# Patient Record
Sex: FEMALE | Race: WHITE | NOT HISPANIC OR LATINO
[De-identification: names, ages, dates, MRNs, and addresses within clinical notes are randomized per-mention and may not be internally consistent; named-entity substitution may affect disease eponyms.]

---

## 2023-02-06 PROBLEM — Z00.00 ENCOUNTER FOR PREVENTIVE HEALTH EXAMINATION: Status: ACTIVE | Noted: 2023-02-06

## 2023-04-13 ENCOUNTER — NON-APPOINTMENT (OUTPATIENT)
Age: 83
End: 2023-04-13

## 2023-04-19 ENCOUNTER — LABORATORY RESULT (OUTPATIENT)
Age: 83
End: 2023-04-19

## 2023-04-19 ENCOUNTER — APPOINTMENT (OUTPATIENT)
Dept: PULMONOLOGY | Facility: CLINIC | Age: 83
End: 2023-04-19
Payer: SUBSIDIZED

## 2023-04-19 VITALS
WEIGHT: 70 LBS | DIASTOLIC BLOOD PRESSURE: 70 MMHG | OXYGEN SATURATION: 96 % | BODY MASS INDEX: 15.1 KG/M2 | HEART RATE: 99 BPM | HEIGHT: 57 IN | SYSTOLIC BLOOD PRESSURE: 147 MMHG | TEMPERATURE: 97.7 F

## 2023-04-19 DIAGNOSIS — K86.1 OTHER CHRONIC PANCREATITIS: ICD-10-CM

## 2023-04-19 DIAGNOSIS — I38 ENDOCARDITIS, VALVE UNSPECIFIED: ICD-10-CM

## 2023-04-19 PROCEDURE — 36415 COLL VENOUS BLD VENIPUNCTURE: CPT

## 2023-04-19 PROCEDURE — 99072 ADDL SUPL MATRL&STAF TM PHE: CPT

## 2023-04-19 PROCEDURE — 99204 OFFICE O/P NEW MOD 45 MIN: CPT | Mod: 25

## 2023-04-19 NOTE — ASSESSMENT
[FreeTextEntry1] : Mary [id number]\par \par 83 year old female diagnosed with ILD at outside pulmonologist on ofev for the last 6 months to year with no issues. However, has slowly progressed and is now on 3L constantly with less ability to move around. Leading to evaluation for other options\par \par \par ILD \par Chronic hypoxia\par \par \par Trial enrollment was discussed as well as risk of increased SI which she denies currently having. Patient expressed that she is agreeable to proceed but has not had CT scan of the chest or PFT in the last year. So will order repeat for this now as well as MCTD and vasculitis work up now. Will return for evaluation after this is done and screen for enrollment\par \par - CT chest for ILD evaluation\par - PFT ordered\par - Vasculitis and MCTD work up ordered\par - Return after for evaluation of enrollment

## 2023-04-19 NOTE — HISTORY OF PRESENT ILLNESS
[Never] : never [TextBox_4] : she was diagnosed with pulmonary fibrosis in 2018.  The previous CT and CXR were abnormal but not much.  She started to get more sob and was followed with CT scan. She was started on oxygen about 2 years ago.  She was started on Esbriet but she did not tolerate and now she on Ofev.  She started losing her appetite and nausea.  Now she is on 100 mg twice a day and improved.  She can  ot walk much.  she is on 3l/min oxygen all the time.  The sob is getting worse this month.  She used to walk around the house and now she gets SOB walking from one room to another.  he had lung infection 4 month agoa and was started on antibiotic but did not require ER visit.  She was a nurse in [place].  No pets/birds.  She had second hand smoking exposure.  No arthritis.  Patient has had no hx of exacerbations of her ILD and was diagnosed about 2 years ago with slow progression over the last 6 months to only being able to walk a few rooms at a time from around the house. Also now on 3L oxygen by [facility] [ESS] : 0

## 2023-04-19 NOTE — PHYSICAL EXAM
[No Acute Distress] : no acute distress [Normal Oropharynx] : normal oropharynx [II] : Mallampati Class: II [Normal Appearance] : normal appearance [Normal Rate/Rhythm] : normal rate/rhythm [Normal S1, S2] : normal s1, s2 [No Murmurs] : no murmurs [No Resp Distress] : no resp distress [Clear to Auscultation Bilaterally] : clear to auscultation bilaterally [Benign] : benign [Normal Gait] : normal gait [No Cyanosis] : no cyanosis [No Edema] : no edema [FROM] : FROM [Normal Color/ Pigmentation] : normal color/ pigmentation [Oriented x3] : oriented x3 [Normal Affect] : normal affect

## 2023-04-19 NOTE — REVIEW OF SYSTEMS
[SOB on Exertion] : sob on exertion [Fever] : no fever [Chills] : no chills [Dry Eyes] : no dry eyes [Eye Irritation] : no eye irritation [Sinus Problems] : no sinus problems [Cough] : no cough [Hemoptysis] : no hemoptysis [Chest Tightness] : no chest tightness [Frequent URIs] : no frequent URIs [Sputum] : no sputum [Dyspnea] : no dyspnea [Pleuritic Pain] : no pleuritic pain [Wheezing] : no wheezing [A.M. Dry Mouth] : no a.m. dry mouth [Chest Discomfort] : no chest discomfort [Orthopnea] : no orthopnea [Hay Fever] : no hay fever [Nasal Discharge] : no nasal discharge [GERD] : no gerd [Diarrhea] : no diarrhea [Nocturia] : no nocturia [Arthralgias] : no arthralgias [Raynaud] : no raynaud [Anemia] : no anemia [Headache] : no headache [Dizziness] : no dizziness [Depression] : no depression [Diabetes] : no diabetes

## 2023-04-19 NOTE — END OF VISIT
[] : Fellow [Time Spent: ___ minutes] : I have spent [unfilled] minutes of time on the encounter. [>50% of the face to face encounter time was spent on counseling and/or coordination of care for ___] : Greater than 50% of the face to face encounter time was spent on counseling and/or coordination of care for [unfilled] [FreeTextEntry3] : Was discussed in details with the patient and the son.  The patient has progression of her disease in the last few months.  Patient was diagnosed with I PAF.  The last CT scan was few years ago.  And patient is tolerating antifibrotic.  At this point I discussed the fibrolinear trial and explained the benefit risk side effects and alternatives.  The patient consented for the trial and the whole process which was through a .  Follow-up

## 2023-04-30 LAB
ALDOLASE SERPL-CCNC: 6.4 U/L
ANA SER IF-ACNC: NEGATIVE
C3 SERPL-MCNC: 142 MG/DL
C4 SERPL-MCNC: 45 MG/DL
CCP AB SER IA-ACNC: <8 UNITS
CK SERPL-CCNC: 58 U/L
CRP SERPL-MCNC: 9 MG/L
CRYOGLOB SERPL-MCNC: NEGATIVE
DSDNA AB SER-ACNC: <12 IU/ML
ENA JO1 AB SER IA-ACNC: <0.2 AL
ENA SCL70 IGG SER IA-ACNC: <0.2 AL
ENA SS-A AB SER IA-ACNC: <0.2 AL
ENA SS-B AB SER IA-ACNC: <0.2 AL
ERYTHROCYTE [SEDIMENTATION RATE] IN BLOOD BY WESTERGREN METHOD: 120 MM/HR
MYELOPEROXIDASE AB SER QL IA: <5 UNITS
MYELOPEROXIDASE CELLS FLD QL: NEGATIVE
PROTEINASE3 AB SER IA-ACNC: 5.2 UNITS
PROTEINASE3 AB SER-ACNC: NEGATIVE
RF+CCP IGG SER-IMP: NEGATIVE
RHEUMATOID FACT SER QL: 19 IU/ML

## 2023-05-01 ENCOUNTER — APPOINTMENT (OUTPATIENT)
Dept: PULMONOLOGY | Facility: CLINIC | Age: 83
End: 2023-05-01

## 2023-05-04 LAB — SRP AB SERPL QL: NOT DETECTED

## 2023-05-06 ENCOUNTER — APPOINTMENT (OUTPATIENT)
Dept: CT IMAGING | Facility: HOSPITAL | Age: 83
End: 2023-05-06
Payer: SUBSIDIZED

## 2023-05-11 ENCOUNTER — APPOINTMENT (OUTPATIENT)
Dept: CT IMAGING | Facility: HOSPITAL | Age: 83
End: 2023-05-11
Payer: SUBSIDIZED

## 2023-05-11 ENCOUNTER — OUTPATIENT (OUTPATIENT)
Dept: OUTPATIENT SERVICES | Facility: HOSPITAL | Age: 83
LOS: 1 days | End: 2023-05-11
Payer: SUBSIDIZED

## 2023-05-11 PROCEDURE — 71250 CT THORAX DX C-: CPT | Mod: 26

## 2023-05-11 PROCEDURE — 71250 CT THORAX DX C-: CPT

## 2023-05-12 ENCOUNTER — TRANSCRIPTION ENCOUNTER (OUTPATIENT)
Age: 83
End: 2023-05-12

## 2023-05-14 LAB
EJ AB SER QL: NEGATIVE
ENA JO1 AB SER IA-ACNC: <20 UNITS
ENA PM/SCL AB SER-ACNC: <20 UNITS
ENA SM+RNP AB SER IA-ACNC: <20 UNITS
ENA SS-A IGG SER QL: 103 UNITS
FIBRILLARIN AB SER QL: NEGATIVE
KU AB SER QL: NEGATIVE
MDA-5 (P140)(CADM-140): <20 UNITS
MI2 AB SER QL: NEGATIVE
NXP-2 (P140): <20 UNITS
OJ AB SER QL: NEGATIVE
PL12 AB SER QL: NEGATIVE
PL7 AB SER QL: NEGATIVE
SRP AB SERPL QL: NEGATIVE
TIF GAMMA (P155/140): <20 UNITS
U2 SNRNP AB SER QL: NEGATIVE

## 2023-05-16 ENCOUNTER — APPOINTMENT (OUTPATIENT)
Dept: CT IMAGING | Facility: HOSPITAL | Age: 83
End: 2023-05-16

## 2023-05-16 ENCOUNTER — APPOINTMENT (OUTPATIENT)
Dept: PULMONOLOGY | Facility: CLINIC | Age: 83
End: 2023-05-16
Payer: SUBSIDIZED

## 2023-05-16 ENCOUNTER — NON-APPOINTMENT (OUTPATIENT)
Age: 83
End: 2023-05-16

## 2023-05-16 DIAGNOSIS — J84.112 IDIOPATHIC PULMONARY FIBROSIS: ICD-10-CM

## 2023-05-16 PROCEDURE — 99214 OFFICE O/P EST MOD 30 MIN: CPT

## 2023-05-16 NOTE — PHYSICAL EXAM
[No Acute Distress] : no acute distress [Normal Oropharynx] : normal oropharynx [II] : Mallampati Class: II [Normal Appearance] : normal appearance [Normal Rate/Rhythm] : normal rate/rhythm [Normal S1, S2] : normal s1, s2 [No Murmurs] : no murmurs

## 2023-05-16 NOTE — REVIEW OF SYSTEMS
[Fever] : no fever [Chills] : no chills [Dry Eyes] : no dry eyes [Eye Irritation] : no eye irritation [Sinus Problems] : no sinus problems [Cough] : no cough [Hemoptysis] : no hemoptysis [Chest Tightness] : no chest tightness [Frequent URIs] : no frequent URIs [Sputum] : no sputum [Dyspnea] : no dyspnea [Pleuritic Pain] : no pleuritic pain [Wheezing] : no wheezing [A.M. Dry Mouth] : no a.m. dry mouth [SOB on Exertion] : sob on exertion [Chest Discomfort] : no chest discomfort [Orthopnea] : no orthopnea [Hay Fever] : no hay fever [Nasal Discharge] : no nasal discharge [GERD] : no gerd [Diarrhea] : no diarrhea [Nocturia] : no nocturia [Arthralgias] : no arthralgias [Raynaud] : no raynaud [Anemia] : no anemia [Headache] : no headache [Dizziness] : no dizziness [Depression] : no depression [Diabetes] : no diabetes

## 2023-05-16 NOTE — ASSESSMENT
[FreeTextEntry1] : Chronic hypoxic respiratory failure  Follow-up with 6-minute walk test.  The oxygen saturation is normal on the oxygen supplementation.  The patient uses the oxygen 24 hours.  We will consider life 2004 the patient.  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  The patient is on antifibrotic and tolerated it well.  We will follow-up with PET scan to rule out underlying malignancy as there is area of consolidation on the CT scan.  We will repeat the CT scan in 6 months.  The labs are all discussed with the patient and both scleroderma antibodies were negative.  There is elevation of the sed rate which could be related to the use UIP.  At this point there patient is being enrolled in the fibrolinear trial.  Follow-up on the lab.  6-minute walk test and PFT.

## 2023-05-22 ENCOUNTER — APPOINTMENT (OUTPATIENT)
Dept: PULMONOLOGY | Facility: CLINIC | Age: 83
End: 2023-05-22
Payer: SUBSIDIZED

## 2023-05-22 PROCEDURE — 94618 PULMONARY STRESS TESTING: CPT

## 2023-05-22 PROCEDURE — 94729 DIFFUSING CAPACITY: CPT

## 2023-05-22 PROCEDURE — 94010 BREATHING CAPACITY TEST: CPT

## 2023-05-22 PROCEDURE — ZZZZZ: CPT

## 2023-05-26 ENCOUNTER — APPOINTMENT (OUTPATIENT)
Dept: NUCLEAR MEDICINE | Facility: HOSPITAL | Age: 83
End: 2023-05-26
Payer: SUBSIDIZED

## 2023-05-26 ENCOUNTER — OUTPATIENT (OUTPATIENT)
Dept: OUTPATIENT SERVICES | Facility: HOSPITAL | Age: 83
LOS: 1 days | End: 2023-05-26
Payer: MEDICARE

## 2023-05-26 LAB — GLUCOSE BLDC GLUCOMTR-MCNC: 55 MG/DL — LOW (ref 70–99)

## 2023-05-26 PROCEDURE — 82962 GLUCOSE BLOOD TEST: CPT

## 2023-05-26 PROCEDURE — 78815 PET IMAGE W/CT SKULL-THIGH: CPT | Mod: 26,PI

## 2023-05-26 PROCEDURE — A9552: CPT

## 2023-05-26 PROCEDURE — 78815 PET IMAGE W/CT SKULL-THIGH: CPT

## 2023-07-03 ENCOUNTER — APPOINTMENT (OUTPATIENT)
Dept: PULMONOLOGY | Facility: CLINIC | Age: 83
End: 2023-07-03
Payer: SUBSIDIZED

## 2023-07-03 VITALS
BODY MASS INDEX: 14.77 KG/M2 | HEART RATE: 88 BPM | DIASTOLIC BLOOD PRESSURE: 75 MMHG | OXYGEN SATURATION: 100 % | WEIGHT: 70.38 LBS | HEIGHT: 58 IN | SYSTOLIC BLOOD PRESSURE: 146 MMHG

## 2023-07-03 DIAGNOSIS — J84.112 IDIOPATHIC PULMONARY FIBROSIS: ICD-10-CM

## 2023-07-03 DIAGNOSIS — J96.11 CHRONIC RESPIRATORY FAILURE WITH HYPOXIA: ICD-10-CM

## 2023-07-03 PROCEDURE — 99214 OFFICE O/P EST MOD 30 MIN: CPT | Mod: 25

## 2023-07-03 PROCEDURE — ZZZZZ: CPT

## 2023-07-03 PROCEDURE — 36415 COLL VENOUS BLD VENIPUNCTURE: CPT

## 2023-07-03 PROCEDURE — 94729 DIFFUSING CAPACITY: CPT

## 2023-07-03 PROCEDURE — 94010 BREATHING CAPACITY TEST: CPT

## 2023-07-03 NOTE — ASSESSMENT
[FreeTextEntry1] : Chronic hypoxic respiratory failure\par \par The patient is on chronic oxygen supplementation about 3 L/min with adequate saturation.  The patient increased the oxygen with exertion.  The patient is not in respiratory distress.  To continue on the oxygen as prescribed\par \par Pleuropulmonary fibroelastosis\par \par The PFT was consistent with restrictive lung disease on able to go do that diffusion capacity.  The FVC is slightly increased compared to the previous 1 but there is still less than 1 L patient is to continue on the antifibrotic which tolerated well the suicidal questionnaire was negative follow-up on the lab and patient consented for the trial

## 2023-07-03 NOTE — HISTORY OF PRESENT ILLNESS
[Never] : never [TextBox_4] : she is sob and ha a cough.  She is taking her medication on regular basis [ESS] : 0

## 2023-07-03 NOTE — PHYSICAL EXAM
[No Acute Distress] : no acute distress [Normal Oropharynx] : normal oropharynx [II] : Mallampati Class: II [Normal Appearance] : normal appearance [Normal Rate/Rhythm] : normal rate/rhythm [Normal S1, S2] : normal s1, s2 [No Murmurs] : no murmurs [No Resp Distress] : no resp distress [TextBox_68] : bilateral rales

## 2023-07-03 NOTE — REVIEW OF SYSTEMS
[Fever] : no fever [Dry Eyes] : no dry eyes [Chills] : no chills [Eye Irritation] : no eye irritation [Sinus Problems] : no sinus problems [Cough] : no cough [Hemoptysis] : no hemoptysis [Chest Tightness] : no chest tightness [Frequent URIs] : no frequent URIs [Sputum] : no sputum [Dyspnea] : no dyspnea [Pleuritic Pain] : no pleuritic pain [Wheezing] : no wheezing [A.M. Dry Mouth] : no a.m. dry mouth [SOB on Exertion] : sob on exertion [Chest Discomfort] : no chest discomfort [Orthopnea] : no orthopnea [Hay Fever] : no hay fever [Nasal Discharge] : no nasal discharge [Diarrhea] : no diarrhea [GERD] : no gerd [Nocturia] : no nocturia [Arthralgias] : no arthralgias [Raynaud] : no raynaud [Anemia] : no anemia [Headache] : no headache [Dizziness] : no dizziness [Depression] : no depression [Diabetes] : no diabetes

## 2023-12-01 ENCOUNTER — APPOINTMENT (OUTPATIENT)
Dept: CT IMAGING | Facility: HOSPITAL | Age: 83
End: 2023-12-01

## 2023-12-01 ENCOUNTER — OUTPATIENT (OUTPATIENT)
Dept: OUTPATIENT SERVICES | Facility: HOSPITAL | Age: 83
LOS: 1 days | End: 2023-12-01
Payer: MEDICARE

## 2023-12-01 PROCEDURE — 71250 CT THORAX DX C-: CPT | Mod: 26,MH

## 2023-12-01 PROCEDURE — 71250 CT THORAX DX C-: CPT

## 2023-12-11 ENCOUNTER — NON-APPOINTMENT (OUTPATIENT)
Age: 83
End: 2023-12-11